# Patient Record
Sex: FEMALE | Race: WHITE
[De-identification: names, ages, dates, MRNs, and addresses within clinical notes are randomized per-mention and may not be internally consistent; named-entity substitution may affect disease eponyms.]

---
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
## 2019-11-05 ENCOUNTER — HOSPITAL ENCOUNTER (EMERGENCY)
Dept: HOSPITAL 61 - ER | Age: 24
Discharge: HOME | End: 2019-11-05
Payer: MEDICAID

## 2019-11-05 VITALS — SYSTOLIC BLOOD PRESSURE: 130 MMHG | DIASTOLIC BLOOD PRESSURE: 80 MMHG

## 2019-11-05 VITALS — HEIGHT: 63 IN | WEIGHT: 130 LBS | BODY MASS INDEX: 23.04 KG/M2

## 2019-11-05 DIAGNOSIS — Z76.0: ICD-10-CM

## 2019-11-05 DIAGNOSIS — Z88.5: ICD-10-CM

## 2019-11-05 DIAGNOSIS — R56.9: Primary | ICD-10-CM

## 2019-11-05 DIAGNOSIS — F41.9: ICD-10-CM

## 2019-11-05 DIAGNOSIS — Z88.0: ICD-10-CM

## 2019-11-05 DIAGNOSIS — Z88.8: ICD-10-CM

## 2019-11-05 PROCEDURE — 99283 EMERGENCY DEPT VISIT LOW MDM: CPT
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
